# Patient Record
Sex: MALE | Race: WHITE | NOT HISPANIC OR LATINO | Employment: FULL TIME | URBAN - METROPOLITAN AREA
[De-identification: names, ages, dates, MRNs, and addresses within clinical notes are randomized per-mention and may not be internally consistent; named-entity substitution may affect disease eponyms.]

---

## 2023-03-16 DIAGNOSIS — K21.9 GASTROESOPHAGEAL REFLUX DISEASE WITHOUT ESOPHAGITIS: Primary | ICD-10-CM

## 2023-03-16 RX ORDER — CHOLECALCIFEROL (VITAMIN D3) 25 MCG
TABLET ORAL
COMMUNITY
Start: 2013-05-01

## 2023-03-16 RX ORDER — OMEPRAZOLE 20 MG/1
20 CAPSULE, DELAYED RELEASE ORAL 2 TIMES DAILY
Qty: 90 CAPSULE | Refills: 2 | Status: SHIPPED | OUTPATIENT
Start: 2023-03-16 | End: 2024-01-31 | Stop reason: DRUGHIGH

## 2023-03-16 RX ORDER — OMEPRAZOLE 20 MG/1
1 CAPSULE, DELAYED RELEASE ORAL 2 TIMES DAILY
COMMUNITY
Start: 2013-05-01 | End: 2023-03-16 | Stop reason: SDUPTHER

## 2023-03-16 RX ORDER — ROSUVASTATIN CALCIUM 20 MG/1
1 TABLET, COATED ORAL NIGHTLY
COMMUNITY
Start: 2019-03-20

## 2023-03-16 RX ORDER — VIT C/E/ZN/COPPR/LUTEIN/ZEAXAN 250MG-90MG
25 CAPSULE ORAL
COMMUNITY

## 2023-03-16 RX ORDER — LORATADINE 10 MG/1
1 TABLET ORAL DAILY
COMMUNITY
Start: 2013-05-01

## 2023-03-16 RX ORDER — GLUCOSAMINE/CHONDROITIN/C/MANG 500-400 MG
CAPSULE ORAL
COMMUNITY

## 2023-03-16 RX ORDER — ASPIRIN 81 MG/1
81 TABLET ORAL
COMMUNITY
Start: 2017-04-28 | End: 2024-05-06

## 2023-03-16 RX ORDER — PRASUGREL 10 MG/1
TABLET, FILM COATED ORAL
COMMUNITY
Start: 2022-05-20 | End: 2023-05-14

## 2023-03-16 RX ORDER — ACETAMINOPHEN 325 MG/1
325 TABLET ORAL EVERY 6 HOURS PRN
COMMUNITY
Start: 2022-04-21

## 2023-03-16 RX ORDER — EZETIMIBE 10 MG/1
1 TABLET ORAL NIGHTLY
COMMUNITY
Start: 2020-11-03 | End: 2024-06-04

## 2024-01-29 PROBLEM — M25.562 LEFT KNEE PAIN: Status: ACTIVE | Noted: 2024-01-29

## 2024-01-29 PROBLEM — M76.31 ILIOTIBIAL BAND SYNDROME OF RIGHT SIDE: Status: ACTIVE | Noted: 2024-01-29

## 2024-01-29 PROBLEM — M22.42 CHONDROMALACIA OF LEFT PATELLOFEMORAL JOINT: Status: ACTIVE | Noted: 2024-01-29

## 2024-01-29 PROBLEM — K64.4 EXTERNAL HEMORRHOIDS: Status: ACTIVE | Noted: 2024-01-29

## 2024-01-29 PROBLEM — I25.10 CAD (CORONARY ATHEROSCLEROTIC DISEASE): Status: ACTIVE | Noted: 2024-01-29

## 2024-01-29 PROBLEM — K60.2 ANAL FISSURE: Status: ACTIVE | Noted: 2024-01-29

## 2024-01-29 PROBLEM — K21.9 ESOPHAGEAL REFLUX: Status: ACTIVE | Noted: 2024-01-29

## 2024-01-29 PROBLEM — R07.9 CHEST PAIN: Status: ACTIVE | Noted: 2024-01-29

## 2024-01-29 PROBLEM — M25.561 KNEE PAIN, RIGHT: Status: ACTIVE | Noted: 2024-01-29

## 2024-01-29 PROBLEM — J31.0 RHINITIS: Status: ACTIVE | Noted: 2024-01-29

## 2024-01-29 PROBLEM — M76.51 PATELLAR TENDINITIS OF RIGHT KNEE: Status: ACTIVE | Noted: 2024-01-29

## 2024-01-30 PROBLEM — B35.1 ONYCHOMYCOSIS OF TOENAIL: Status: ACTIVE | Noted: 2024-01-30

## 2024-01-30 NOTE — PROGRESS NOTES
Subjective   Patient ID: Jadon Ramirez is a 48 y.o. male who presents for CPE    HPI   Over the past year, the patient reports continued chronic intermittent episodes of tightness aching in the left upper chest region radiating to the left upper back region.  He reports that the episodes still occur at rest and improve with activity.  He reports that the episodes are not affected by oral intake.  He reports no associated diaphoresis, shortness of breath, nausea, vomiting, presyncope.  Over the past few months, he reports an increase in the frequency of episodes which correlates to his weight gain.    Over the past year, the patient reports experiencing only 1 brief episode of spontaneous epistaxis from the left nostril.  This episode occurred while he was running outside and was 5 minutes in duration.  He reports no other associated symptoms.    Over the past year, the patient reports continued intermittent episodes of soreness in the anal region.  He does report that the episodes can occur after anal sex but on a few occasions have occurred while straining during defecation with passage of hard stool.  He reports no other associated symptoms.    Over the past year, the patient reports 2 brief episodes of pinching around either ankle.  He reports no precipitating, exacerbating, relieving factors.  He reports no radiation of discomfort and no other associated symptoms.  No other new complaints.  Review of Systems    Objective   There were no vitals taken for this visit.    Physical Exam  Head-palpation revealed no tenderness over the maxillary or frontal sinuses  Eyes-extraocular movements intact. Pupils equal react to light. Fundi revealed good retinal color, no hemorrhages or exudates  Ears-palpation of each pinna and each tragus revealed no tenderness. External auditory canals not erythematous or swollen, TMs clear  Nose-turbinates not erythematous or swollen. Nasal septal deviation noted to the  left  Mouth-posterior pharynx not erythematous or swollen. Tonsillar pillars appeared normal, no exudates  Neck-no lymphadenopathy. Thyroid gland not enlarged, no bruits  Breast-pierced nipples noted.  Lungs - clear to auscultation bilaterally.  Cardiac -rate normal, rhythm regular, no murmurs, no JVD.  Abdomen - soft nondistended, normal active bowel sounds. Palpation revealed no tenderness or masses.  Pulses-upper extremities 2+ bilaterally, lower extremities 0 bilaterally  Extremities - no peripheral edema.  Musculoskeletal  Chest-no erythema or swelling, Full range of motion in all directions of motion with no pain. Palpation revealed no tenderness or increase in warmth  Left knee-no erythema or swelling.  Full range of motion in all directions of motion no pain.  Palpation revealed no tenderness, increase in warmth, or crepitus.  Negative Lachemann's test, negative Kiya test, negative patellar apprehension test; no pain or laxity of the collateral ligaments noted.  Right knee-no erythema or swelling.  Full range of motion in all directions of motion with no pain.  Palpation did reveal mild crepitus but no tenderness or increase in warmth.  Negative Lachemann's test, negative Kiya's test, negative patellar apprehension test, no pain or laxity of the collateral ligaments noted.  Ankles-no erythema or swelling, Full range of motion in all directions of motion with no pain. Palpation revealed no tenderness or increase in warmth  Lumbar spine-no erythema or swelling. Full range of motion with tightness noted left buttock region on rotation to the left and bending to the right..  Full range of motion with discomfort noted in the right buttock region on bending to the left and rotation to the right.  Full range of motion with tightness and soreness noted on extension. Full range of motion with no soreness or tightness on flexion. Palpation revealed no tenderness or increase in warmth.  Neurologic  Cranial  nerves-2 through 12 grossly intact, no visual field abnormalities  Motor-no pronator drift noted, strength-5/5 in all muscle groups tested, , no tremor noted. No bradykinesia noted. No rigidity noted. Negative pull test  Sensory-light touch sensation fully intact  Pinprick sensation fully intact  Vibratory sensation fully intact  Cerebellar-no truncal ataxia, good coordination finger-nose testing,, good coordination heel-to-shin testing, normal rapid alternating movements  Romberg negative, no abnormality in tandem gait  Reflexes-1+/4 bilaterally in the upper extremities; 2+/4 bilaterally in the lower extremities  Assessment/Plan        Assessment  Chronic intermittent episodes of tightness aching in the left upper chest region radiating to the left upper back region--may be secondary to neuromuscular chest discomfort, gastroesophageal reflux  Coronary artery disease status post PTCA stenting 95% mid proximal LAD lesion April 2017, PTCA and stenting of a 80% circumflex lesion April 21, 2022  Hyposmia-may represent a side effect from administration of Effient.  Chronic occasional spontaneous episodes of epistaxis left nostril greater than right nostril-May represent a side effect from administration of Effient in the setting of dry nasal mucosa.  Allergic rhinitis  Status post tonsillectomy April 23, 2014  Gastroesophageal reflux  Chronic intermittent episodes of soreness in the anal region-may be secondary to anal fissure,   Anal fissure  Right posterior tender external hemorrhoid November 2021  Hemorrhoids  Herpes genitalis  Chronic intermittent interruption of urine stream-may be secondary to irritable bladder, BPH  Chronic intermittent episodes of aching pain in both knees-may be secondary to patellar tendinitis of the right knee, iliotibial band syndrome right knee, osteoarthritis  Vitamin D deficiency  Hyperlipidemia  Acne vulgaris  Onychomycosis fourth and fifth toenails right foot  Chronic occasional episodes  of soreness in the lower back region-may be secondary to lumbosacral strain, osteoarthritis and degenerative disc disease of the lumbosacral spine  Plan  Obtain CBC differential, CMP, fasting lipid profile, TSH, vitamin D level, vitamin B12 level, PSA, cardiac CRP as soon as possible..  I have recommended that the patient receive 2 doses of Shingrix beginning at age 50.  I recommended that he continue to receive 1 dose of flu vaccine every year and continue his booster doses of COVID-19 vaccine  I did asked the patient to apply Voltaren gel to painful regions 4 times per day. I also told the patient that he could take Tylenol 1000 mg p.o. every 6 hours as needed pain.  I recommended that he use saline nasal spray 2 sprays to each nostril 2-3 times per day as needed during the winter months to keep the nasal mucosa moist  Continue all other current medications for now.  Patient should return for complete physical examination in 1 year

## 2024-01-31 ENCOUNTER — OFFICE VISIT (OUTPATIENT)
Dept: CARDIOLOGY | Facility: CLINIC | Age: 49
End: 2024-01-31
Payer: COMMERCIAL

## 2024-01-31 ENCOUNTER — OFFICE VISIT (OUTPATIENT)
Dept: PRIMARY CARE | Facility: CLINIC | Age: 49
End: 2024-01-31
Payer: COMMERCIAL

## 2024-01-31 VITALS
HEIGHT: 69 IN | DIASTOLIC BLOOD PRESSURE: 82 MMHG | HEART RATE: 64 BPM | BODY MASS INDEX: 28.17 KG/M2 | OXYGEN SATURATION: 95 % | WEIGHT: 190.2 LBS | SYSTOLIC BLOOD PRESSURE: 145 MMHG

## 2024-01-31 VITALS
SYSTOLIC BLOOD PRESSURE: 100 MMHG | HEART RATE: 60 BPM | BODY MASS INDEX: 27.29 KG/M2 | DIASTOLIC BLOOD PRESSURE: 70 MMHG | WEIGHT: 184.8 LBS

## 2024-01-31 DIAGNOSIS — I25.10 ATHEROSCLEROSIS OF NATIVE CORONARY ARTERY OF NATIVE HEART WITHOUT ANGINA PECTORIS: Primary | ICD-10-CM

## 2024-01-31 DIAGNOSIS — M25.561 CHRONIC PAIN OF RIGHT KNEE: ICD-10-CM

## 2024-01-31 DIAGNOSIS — M22.42 CHONDROMALACIA OF LEFT PATELLOFEMORAL JOINT: ICD-10-CM

## 2024-01-31 DIAGNOSIS — G89.29 CHRONIC PAIN OF LEFT KNEE: ICD-10-CM

## 2024-01-31 DIAGNOSIS — L30.9 HAND ECZEMA: ICD-10-CM

## 2024-01-31 DIAGNOSIS — M25.562 CHRONIC PAIN OF LEFT KNEE: ICD-10-CM

## 2024-01-31 DIAGNOSIS — I20.89 OTHER FORMS OF ANGINA PECTORIS (CMS-HCC): ICD-10-CM

## 2024-01-31 DIAGNOSIS — Z00.00 ROUTINE GENERAL MEDICAL EXAMINATION AT A HEALTH CARE FACILITY: ICD-10-CM

## 2024-01-31 DIAGNOSIS — M76.51 PATELLAR TENDINITIS OF RIGHT KNEE: ICD-10-CM

## 2024-01-31 DIAGNOSIS — K21.9 GASTROESOPHAGEAL REFLUX DISEASE WITHOUT ESOPHAGITIS: ICD-10-CM

## 2024-01-31 DIAGNOSIS — G89.29 CHRONIC PAIN OF RIGHT KNEE: ICD-10-CM

## 2024-01-31 DIAGNOSIS — R07.89 OTHER CHEST PAIN: ICD-10-CM

## 2024-01-31 LAB
POC APPEARANCE, URINE: CLEAR
POC BILIRUBIN, URINE: NEGATIVE
POC BLOOD, URINE: NEGATIVE
POC COLOR, URINE: YELLOW
POC GLUCOSE, URINE: NEGATIVE MG/DL
POC KETONES, URINE: NEGATIVE MG/DL
POC LEUKOCYTES, URINE: NEGATIVE
POC NITRITE,URINE: NEGATIVE
POC PH, URINE: 6 PH
POC PROTEIN, URINE: NEGATIVE MG/DL
POC SPECIFIC GRAVITY, URINE: 1.02
POC UROBILINOGEN, URINE: 0.2 EU/DL

## 2024-01-31 PROCEDURE — 99214 OFFICE O/P EST MOD 30 MIN: CPT | Performed by: INTERNAL MEDICINE

## 2024-01-31 PROCEDURE — 81002 URINALYSIS NONAUTO W/O SCOPE: CPT | Performed by: INTERNAL MEDICINE

## 2024-01-31 PROCEDURE — 1036F TOBACCO NON-USER: CPT | Performed by: INTERNAL MEDICINE

## 2024-01-31 PROCEDURE — 93005 ELECTROCARDIOGRAM TRACING: CPT | Performed by: INTERNAL MEDICINE

## 2024-01-31 PROCEDURE — 99396 PREV VISIT EST AGE 40-64: CPT | Performed by: INTERNAL MEDICINE

## 2024-01-31 PROCEDURE — 99213 OFFICE O/P EST LOW 20 MIN: CPT | Performed by: INTERNAL MEDICINE

## 2024-01-31 PROCEDURE — 93010 ELECTROCARDIOGRAM REPORT: CPT | Performed by: INTERNAL MEDICINE

## 2024-01-31 RX ORDER — OMEPRAZOLE 20 MG/1
20 CAPSULE, DELAYED RELEASE ORAL DAILY
Qty: 90 CAPSULE | Refills: 2 | Status: SHIPPED | OUTPATIENT
Start: 2024-01-31 | End: 2024-03-18

## 2024-01-31 RX ORDER — FLUOCINONIDE 0.5 MG/G
OINTMENT TOPICAL 2 TIMES DAILY PRN
Qty: 30 G | Refills: 2 | Status: SHIPPED | OUTPATIENT
Start: 2024-01-31 | End: 2025-01-30

## 2024-01-31 NOTE — PROGRESS NOTES
Primary Care Physician: Alan Euceda MD  Date of Visit: 01/31/2024  9:40 AM EST  Location of visit: [facility] [place] [address]     Chief Complaint:   Chief Complaint   Patient presents with    Follow-up     1 year follow up.        HPI / Summary:   Jadon Ramirez is a 48 y.o. male presents for followup.     April 2017 LAD stent resolute integrity 2.5X 30 mm.  April 2022 cardiac catheterization widely patent LAD stent, high-grade circumflex stenosis with stenting resolute Finley 3.5X 15 mm.    Tightness around left chest over two months.  Stable frequency, no triggers.  Driving aggressively. Stress tension.    2017 CP with a run. LAD stent.  2022 felt sluggish when ran.  Ran 4 miles and felt well.  Does smoke a cigar rarely, and occasional ETOH.  Gained weight.    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    Specialty Problems          Cardiology Problems    CAD (coronary atherosclerotic disease)    Chest pain        Past Medical History:   Diagnosis Date    Personal history of other diseases of the digestive system 01/04/2023    History of esophageal reflux    Snoring     Snoring          Past Surgical History:   Procedure Laterality Date    OTHER SURGICAL HISTORY  12/13/2013    Dental Surgery          Social History:   reports that he has never smoked. He has never been exposed to tobacco smoke. He has never used smokeless tobacco. He reports current alcohol use of about 4.0 standard drinks of alcohol per week. He reports that he does not use drugs.      Allergies:  Allergies   Allergen Reactions    Atorvastatin Other and Headache       Outpatient Medications:  Current Outpatient Medications   Medication Instructions    acetaminophen (TYLENOL) 325 mg, oral, Every 6 hours PRN    aspirin 81 mg, oral    cholecalciferol (Vitamin D-3) 25 MCG (1000 UT) tablet oral    cholecalciferol (VITAMIN D-3) 25 mcg, oral    ezetimibe (Zetia) 10 mg tablet 1 tablet, oral, Nightly    glucosam/chond-msm1/C/asia/bor (GLUCOSAMINE-CHOND-MSM  "COMPLEX ORAL) 1 tablet, oral, Daily    glucosamine-chondroit-vit C-Mn 500-400 mg capsule oral    loratadine (Claritin) 10 mg tablet 1 tablet, oral, Daily    omeprazole (PRILOSEC) 20 mg, oral, 2 times daily    rosuvastatin (Crestor) 20 mg tablet 1 tablet, oral, Nightly       ROS     Physical Exam:  Vitals:    01/31/24 0950   BP: 145/82   Pulse: 64   SpO2: 95%   Weight: 86.3 kg (190 lb 3.2 oz)   Height: 1.753 m (5' 9\")     Wt Readings from Last 5 Encounters:   01/31/24 86.3 kg (190 lb 3.2 oz)   01/04/23 81.2 kg (179 lb 1 oz)   01/04/23 80.8 kg (178 lb 2 oz)   05/06/22 77.4 kg (170 lb 9 oz)   12/20/21 83 kg (183 lb 1 oz)     Body mass index is 28.09 kg/m².   Developed male in no acute distress.  Flat JVP.  Normal carotid upstrokes.  Regular rhythm no gallop or murmur.  Clear lungs.  Soft abdomen.  No dependent edema     Last Labs:  CMP:  Recent Labs     01/21/23  0813 04/21/22  0635 06/26/21  0949 12/30/20  0933 03/23/19  0957    139 138  --  139   K 3.9 3.9 4.1  --  4.1    105 104  --  103   CO2 29 25 26  --  28   ANIONGAP 11 13 12  --  12   BUN 14 11 14  --  17   CREATININE 1.21 1.12 1.15  --  1.16   GLUCOSE 90 89 96 84 86     Recent Labs     01/21/23  0813 06/26/21  0949 03/23/19  0957   ALBUMIN 4.5 4.6 4.3   ALKPHOS 59 68 66   ALT 33 51 28   AST 26 33 22   BILITOT 1.0 0.8 0.7     CBC:  Recent Labs     01/21/23  0813 04/21/22  0635 06/26/21  0949 03/23/19  0957 09/29/18  0836   WBC 4.8 5.3 4.7 5.3  --    HGB 14.8 14.7 14.7 14.2 13.8   HCT 43.6 42.3 43.5 42.9 42.0    200 199 196  --    MCV 92 92 94 93  --      COAG: No results for input(s): \"INR\", \"DDIMERVTE\" in the last 27992 hours.  HEME/ENDO:  Recent Labs     01/21/23  0813 06/26/21  0949 03/23/19  0957   TSH 0.93 0.83 0.58      CARDIAC: No results for input(s): \"LDH\", \"CKMB\", \"TROPHS\", \"BNP\" in the last 44723 hours.    No lab exists for component: \"CK\", \"CKMBP\"  Recent Labs     01/21/23  0813 06/26/21  0949 12/30/20  0933   CHOL 122 139 143 "   LDLF 54 64 57   HDL 49.2 46.0 45.0   TRIG 95 146 203*       Last Cardiology Tests:  ECG:    Sinus sinus rhythm with nonspecific ST-T abnormality  Echo:  Echo Results:  No results found for this or any previous visit from the past 3650 days.       Cath:      Stress Test:  Stress Results:  No results found for this or any previous visit from the past 365 days.         Cardiac Imaging:  Electrocardiogram 12 Lead  Sinus bradycardia  Possible Left atrial enlargement  Nonspecific T wave abnormality  Abnormal ECG  No previous ECGs available  Confirmed by MEDINA SCRUGGS MD ([id number]) on 1/4/2017 2:41:32 AM        Assessment/Plan   Nonexertional chest pain in this otherwise very fit and active gentleman.  Occasional tobacco use, occasional alcohol use.  ECG with minor nonspecific ST-T abnormality.  Recommend stress echo given the fact that he has relatively aggressive ASCVD requiring an LAD stent in 2017 and a circumflex stent in 2022.  [facility]  He may need clearance before his next appointment stress test will also be helpful in this regard        Orders:  No orders of the defined types were placed in this encounter.     Followup Appts:  Future Appointments   Date Time Provider Department Center   1/31/2024 11:00 AM Alan Euceda MD [id number] [facility]           ____________________________________________________________  Raymundo Yeung MD    Senior Attending Physician  [facility]  [facility]

## 2024-02-02 ENCOUNTER — LAB (OUTPATIENT)
Dept: LAB | Facility: LAB | Age: 49
End: 2024-02-02
Payer: COMMERCIAL

## 2024-02-02 DIAGNOSIS — G89.29 CHRONIC PAIN OF LEFT KNEE: ICD-10-CM

## 2024-02-02 DIAGNOSIS — L30.9 HAND ECZEMA: ICD-10-CM

## 2024-02-02 DIAGNOSIS — M25.562 CHRONIC PAIN OF LEFT KNEE: ICD-10-CM

## 2024-02-02 DIAGNOSIS — K21.9 GASTROESOPHAGEAL REFLUX DISEASE WITHOUT ESOPHAGITIS: ICD-10-CM

## 2024-02-02 DIAGNOSIS — I25.10 ATHEROSCLEROSIS OF NATIVE CORONARY ARTERY OF NATIVE HEART WITHOUT ANGINA PECTORIS: ICD-10-CM

## 2024-02-02 DIAGNOSIS — M22.42 CHONDROMALACIA OF LEFT PATELLOFEMORAL JOINT: ICD-10-CM

## 2024-02-02 DIAGNOSIS — Z00.00 ROUTINE GENERAL MEDICAL EXAMINATION AT A HEALTH CARE FACILITY: ICD-10-CM

## 2024-02-02 DIAGNOSIS — R07.89 OTHER CHEST PAIN: ICD-10-CM

## 2024-02-02 DIAGNOSIS — G89.29 CHRONIC PAIN OF RIGHT KNEE: ICD-10-CM

## 2024-02-02 DIAGNOSIS — M76.51 PATELLAR TENDINITIS OF RIGHT KNEE: ICD-10-CM

## 2024-02-02 DIAGNOSIS — M25.561 CHRONIC PAIN OF RIGHT KNEE: ICD-10-CM

## 2024-02-02 LAB
25(OH)D3 SERPL-MCNC: 45 NG/ML (ref 30–100)
ALBUMIN SERPL BCP-MCNC: 4.6 G/DL (ref 3.4–5)
ALP SERPL-CCNC: 54 U/L (ref 33–120)
ALT SERPL W P-5'-P-CCNC: 43 U/L (ref 10–52)
ANION GAP SERPL CALC-SCNC: 14 MMOL/L (ref 10–20)
AST SERPL W P-5'-P-CCNC: 27 U/L (ref 9–39)
BASOPHILS # BLD AUTO: 0.04 X10*3/UL (ref 0–0.1)
BASOPHILS NFR BLD AUTO: 0.8 %
BILIRUB SERPL-MCNC: 0.8 MG/DL (ref 0–1.2)
BUN SERPL-MCNC: 14 MG/DL (ref 6–23)
CALCIUM SERPL-MCNC: 9.5 MG/DL (ref 8.6–10.3)
CHLORIDE SERPL-SCNC: 103 MMOL/L (ref 98–107)
CHOLEST SERPL-MCNC: 140 MG/DL (ref 0–199)
CHOLESTEROL/HDL RATIO: 2.8
CO2 SERPL-SCNC: 28 MMOL/L (ref 21–32)
CREAT SERPL-MCNC: 1.13 MG/DL (ref 0.5–1.3)
CRP SERPL HS-MCNC: 0.3 MG/L
EGFRCR SERPLBLD CKD-EPI 2021: 80 ML/MIN/1.73M*2
EOSINOPHIL # BLD AUTO: 0.11 X10*3/UL (ref 0–0.7)
EOSINOPHIL NFR BLD AUTO: 2.2 %
ERYTHROCYTE [DISTWIDTH] IN BLOOD BY AUTOMATED COUNT: 12.3 % (ref 11.5–14.5)
GLUCOSE SERPL-MCNC: 96 MG/DL (ref 74–99)
HCT VFR BLD AUTO: 42.7 % (ref 41–52)
HCV AB SER QL: NONREACTIVE
HDLC SERPL-MCNC: 49.7 MG/DL
HGB BLD-MCNC: 15 G/DL (ref 13.5–17.5)
HIV 1+2 AB+HIV1 P24 AG SERPL QL IA: NONREACTIVE
IMM GRANULOCYTES # BLD AUTO: 0.01 X10*3/UL (ref 0–0.7)
IMM GRANULOCYTES NFR BLD AUTO: 0.2 % (ref 0–0.9)
LDLC SERPL CALC-MCNC: 49 MG/DL
LYMPHOCYTES # BLD AUTO: 2.26 X10*3/UL (ref 1.2–4.8)
LYMPHOCYTES NFR BLD AUTO: 44.7 %
MCH RBC QN AUTO: 32.4 PG (ref 26–34)
MCHC RBC AUTO-ENTMCNC: 35.1 G/DL (ref 32–36)
MCV RBC AUTO: 92 FL (ref 80–100)
MONOCYTES # BLD AUTO: 0.52 X10*3/UL (ref 0.1–1)
MONOCYTES NFR BLD AUTO: 10.3 %
NEUTROPHILS # BLD AUTO: 2.12 X10*3/UL (ref 1.2–7.7)
NEUTROPHILS NFR BLD AUTO: 41.8 %
NON HDL CHOLESTEROL: 90 MG/DL (ref 0–149)
NRBC BLD-RTO: 0 /100 WBCS (ref 0–0)
PLATELET # BLD AUTO: 204 X10*3/UL (ref 150–450)
POTASSIUM SERPL-SCNC: 4.1 MMOL/L (ref 3.5–5.3)
PROT SERPL-MCNC: 6.9 G/DL (ref 6.4–8.2)
PSA SERPL-MCNC: 1.02 NG/ML
RBC # BLD AUTO: 4.63 X10*6/UL (ref 4.5–5.9)
SODIUM SERPL-SCNC: 141 MMOL/L (ref 136–145)
TREPONEMA PALLIDUM IGG+IGM AB [PRESENCE] IN SERUM OR PLASMA BY IMMUNOASSAY: NONREACTIVE
TRIGL SERPL-MCNC: 209 MG/DL (ref 0–149)
TSH SERPL-ACNC: 1.33 MIU/L (ref 0.44–3.98)
VIT B12 SERPL-MCNC: 236 PG/ML (ref 211–911)
VLDL: 42 MG/DL (ref 0–40)
WBC # BLD AUTO: 5.1 X10*3/UL (ref 4.4–11.3)

## 2024-02-02 PROCEDURE — 86780 TREPONEMA PALLIDUM: CPT

## 2024-02-02 PROCEDURE — 86803 HEPATITIS C AB TEST: CPT

## 2024-02-02 PROCEDURE — 82306 VITAMIN D 25 HYDROXY: CPT

## 2024-02-02 PROCEDURE — 84443 ASSAY THYROID STIM HORMONE: CPT

## 2024-02-02 PROCEDURE — 86141 C-REACTIVE PROTEIN HS: CPT

## 2024-02-02 PROCEDURE — 36415 COLL VENOUS BLD VENIPUNCTURE: CPT

## 2024-02-02 PROCEDURE — 80061 LIPID PANEL: CPT

## 2024-02-02 PROCEDURE — 85025 COMPLETE CBC W/AUTO DIFF WBC: CPT

## 2024-02-02 PROCEDURE — 80053 COMPREHEN METABOLIC PANEL: CPT

## 2024-02-02 PROCEDURE — 84153 ASSAY OF PSA TOTAL: CPT

## 2024-02-02 PROCEDURE — 87389 HIV-1 AG W/HIV-1&-2 AB AG IA: CPT

## 2024-02-02 PROCEDURE — 82607 VITAMIN B-12: CPT

## 2024-02-05 LAB
ATRIAL RATE: 61 BPM
P AXIS: 47 DEGREES
P OFFSET: 160 MS
P ONSET: 109 MS
PR INTERVAL: 200 MS
Q ONSET: 209 MS
QRS COUNT: 11 BEATS
QRS DURATION: 86 MS
QT INTERVAL: 396 MS
QTC CALCULATION(BAZETT): 398 MS
QTC FREDERICIA: 397 MS
R AXIS: 56 DEGREES
T AXIS: 94 DEGREES
T OFFSET: 407 MS
VENTRICULAR RATE: 61 BPM

## 2024-03-15 DIAGNOSIS — K21.9 GASTROESOPHAGEAL REFLUX DISEASE WITHOUT ESOPHAGITIS: ICD-10-CM

## 2024-03-18 RX ORDER — OMEPRAZOLE 20 MG/1
CAPSULE, DELAYED RELEASE ORAL
Qty: 180 CAPSULE | Refills: 3 | Status: SHIPPED | OUTPATIENT
Start: 2024-03-18

## 2024-05-06 DIAGNOSIS — I25.10 ATHEROSCLEROSIS OF NATIVE CORONARY ARTERY OF NATIVE HEART WITHOUT ANGINA PECTORIS: Primary | ICD-10-CM

## 2024-05-06 RX ORDER — ASPIRIN 81 MG/1
81 TABLET ORAL DAILY
Qty: 90 TABLET | Refills: 3 | Status: SHIPPED | OUTPATIENT
Start: 2024-05-06

## 2024-05-30 ENCOUNTER — CLINICAL SUPPORT (OUTPATIENT)
Dept: PRIMARY CARE | Facility: CLINIC | Age: 49
End: 2024-05-30
Payer: COMMERCIAL

## 2024-05-30 DIAGNOSIS — Z23 ENCOUNTER FOR IMMUNIZATION: ICD-10-CM

## 2024-05-30 PROCEDURE — 90750 HZV VACC RECOMBINANT IM: CPT | Performed by: INTERNAL MEDICINE

## 2024-05-30 PROCEDURE — 90471 IMMUNIZATION ADMIN: CPT | Performed by: INTERNAL MEDICINE

## 2024-05-30 NOTE — PROGRESS NOTES
Subjective   Patient ID: Lacho Ramirez is a 49 y.o. male who presents for Immunizations. 1st dose of Shingles vaccination given in left deltoid.    HPI     Review of Systems    Objective   There were no vitals taken for this visit.    Physical Exam    Assessment/Plan

## 2024-06-04 DIAGNOSIS — I20.89 OTHER FORMS OF ANGINA PECTORIS (CMS-HCC): Primary | ICD-10-CM

## 2024-06-04 RX ORDER — EZETIMIBE 10 MG/1
10 TABLET ORAL NIGHTLY
Qty: 90 TABLET | Refills: 3 | Status: SHIPPED | OUTPATIENT
Start: 2024-06-04

## 2024-08-28 ENCOUNTER — APPOINTMENT (OUTPATIENT)
Dept: PRIMARY CARE | Facility: CLINIC | Age: 49
End: 2024-08-28
Payer: COMMERCIAL

## 2024-08-28 DIAGNOSIS — Z23 ENCOUNTER FOR IMMUNIZATION: Primary | ICD-10-CM

## 2024-08-28 PROCEDURE — 99212 OFFICE O/P EST SF 10 MIN: CPT | Performed by: INTERNAL MEDICINE

## 2024-08-28 PROCEDURE — 90471 IMMUNIZATION ADMIN: CPT | Performed by: FAMILY MEDICINE

## 2024-08-28 PROCEDURE — 90750 HZV VACC RECOMBINANT IM: CPT | Performed by: FAMILY MEDICINE

## 2024-11-07 DIAGNOSIS — I25.10 ATHEROSCLEROSIS OF NATIVE CORONARY ARTERY OF NATIVE HEART WITHOUT ANGINA PECTORIS: Primary | ICD-10-CM

## 2024-11-07 RX ORDER — ROSUVASTATIN CALCIUM 20 MG/1
20 TABLET, COATED ORAL NIGHTLY
Qty: 90 TABLET | Refills: 3 | Status: SHIPPED | OUTPATIENT
Start: 2024-11-07

## 2025-01-29 ENCOUNTER — APPOINTMENT (OUTPATIENT)
Dept: CARDIOLOGY | Facility: CLINIC | Age: 50
End: 2025-01-29
Payer: COMMERCIAL

## 2025-02-01 NOTE — PROGRESS NOTES
Subjective   Patient ID: Lacho Ramirez is a 49 y.o. male who presents for CPE    HPI   Since early November 2024, the patient reports a history of a nonproductive cough.  He reports that the cough may be precipitated by exposure to cold.  He does report associated intermittent nasal congestion, rhinorrhea, postnasal drip for a few days in early January.  He reports no other associated symptoms.  He did present to the emergency department at the [facility] December 1.  He had a thorough evaluation including chest x-ray, CT scan of the sinuses.  It was thought that the patient's cough may be secondary to gastroesophageal reflux.  He discontinued omeprazole approximately 5 weeks ago and began use of Nexium over-the-counter 20 mg daily.  Since beginning use of Nexium over-the-counter, he has noted a decrease in the frequency of cough.    Since spring 2024, he reports no episodes of tightness in the left upper chest region.    He reports that since October 2024, he has experienced 4 spontaneous episodes of epistaxis from the left nostril.  He reports that each episode was a few minutes in duration.  He reports no other associated symptoms.    Since 2021, he does report continued chronic hyposmia.  He reports no associated symptoms.  He reports that the hyposmia developed when he experienced loss of taste and cough in 2021.  The patient never tested for COVID-19 at that time.    Over the past year, the patient reports continued chronic intermittent interruption of urine stream.  He reports that the episodes only occur with decreased fluid intake.  He does report associated weaker urine stream at those times.  No other associated symptoms.    Over the past year, the patient reports continued chronic intermittent episodes of soreness in the anal region.  He again reports that the episodes can occur after anal sex and also can occur after straining during defecation.  He reports no associated symptoms.  Over the past year, the  patient reports a decrease in the frequency but no change in the intensity of the episodes.    No other new complaints.  Review of Systems    Objective   There were no vitals taken for this visit.    Physical Exam  Head-palpation revealed no tenderness over the maxillary or frontal sinuses  Eyes-extraocular movements intact. Pupils equal react to light. Fundi not well-visualized.  Ears-palpation of each pinna and each tragus revealed no tenderness. External auditory canals not erythematous or swollen, TMs clear  Nose-turbinates not erythematous or swollen. Nasal septal deviation noted to the left  Mouth-posterior pharynx not erythematous or swollen. Tonsillar pillars appeared normal, no exudates  Neck-no lymphadenopathy. Thyroid gland not enlarged, no bruits  Breast-pierced nipples noted.  Lungs - clear to auscultation bilaterally.  Cardiac -rate normal, rhythm regular, no murmurs, no JVD.  Abdomen - soft nondistended, normal active bowel sounds. Palpation revealed no tenderness or masses.  Pulses-upper extremities 2+ bilaterally, lower extremities 0 bilaterally  Extremities - no peripheral edema.  Musculoskeletal    Left knee-no erythema or swelling.  Full range of motion in all directions of motion no pain.  Palpation revealed mild crepitus but no tenderness, increase in warmth,.  Negative Lachemann's test, negative Kiya test, negative patellar apprehension test; no pain or laxity of the collateral ligaments noted.  Right knee-no erythema or swelling.  Full range of motion in all directions of motion with no pain.  Palpation did reveal mild crepitus but no tenderness or increase in warmth.  Negative Lachemann's test, negative Kiya's test, negative patellar apprehension test, no pain or laxity of the collateral ligaments noted.  Lumbar spine-no erythema or swelling.  Full range of motion with soreness noted on extension. Full range of motion with tightness noted on bending bilaterally.  Full range of motion  with no pain or tightness noted in all other directions of motion.  Palpation revealed no tenderness or increase in warmth.  Neurologic  Cranial nerves-2 through 12 grossly intact, no visual field abnormalities  Motor-no pronator drift noted, strength-5/5 in all muscle groups tested, , no tremor noted. No bradykinesia noted. No rigidity noted. Negative pull test  Sensory-light touch sensation fully intact  Pinprick sensation fully intact  Vibratory sensation fully intact  Cerebellar-no truncal ataxia, good coordination finger-nose testing,, good coordination heel-to-shin testing, normal rapid alternating movements  Romberg negative, no abnormality in tandem gait  Reflexes-1+/4 bilaterally   Assessment/Plan   Problem List Items Addressed This Visit             ICD-10-CM    CAD (coronary atherosclerotic disease) I25.10    Relevant Orders    CBC and Auto Differential    Comprehensive Metabolic Panel    Lipid Panel    Prostate Specific Antigen    Thyroid Stimulating Hormone    Vitamin B12    Vitamin D 25-Hydroxy,Total (for eval of Vitamin D levels)    Anal fissure K60.2    Relevant Orders    CBC and Auto Differential    Comprehensive Metabolic Panel    Lipid Panel    Prostate Specific Antigen    Thyroid Stimulating Hormone    Vitamin B12    Vitamin D 25-Hydroxy,Total (for eval of Vitamin D levels)    Esophageal reflux K21.9    Relevant Medications    esomeprazole (NexIUM) 40 mg DR capsule    Other Relevant Orders    CBC and Auto Differential    Comprehensive Metabolic Panel    Lipid Panel    Prostate Specific Antigen    Thyroid Stimulating Hormone    Vitamin B12    Vitamin D 25-Hydroxy,Total (for eval of Vitamin D levels)    Knee pain, right M25.561    Relevant Orders    CBC and Auto Differential    Comprehensive Metabolic Panel    Lipid Panel    Prostate Specific Antigen    Thyroid Stimulating Hormone    Vitamin B12    Vitamin D 25-Hydroxy,Total (for eval of Vitamin D levels)    Left knee pain M25.562    Relevant  Orders    CBC and Auto Differential    Comprehensive Metabolic Panel    Lipid Panel    Prostate Specific Antigen    Thyroid Stimulating Hormone    Vitamin B12    Vitamin D 25-Hydroxy,Total (for eval of Vitamin D levels)     Other Visit Diagnoses         Codes    Routine general medical examination at a health care facility    -  Primary Z00.00    Relevant Orders    CBC and Auto Differential    Comprehensive Metabolic Panel    Lipid Panel    Prostate Specific Antigen    Thyroid Stimulating Hormone    Vitamin B12    Vitamin D 25-Hydroxy,Total (for eval of Vitamin D levels)    Chronic cough     R05.3    Relevant Medications    esomeprazole (NexIUM) 40 mg DR capsule    fluticasone propion-salmeteroL (Advair Diskus) 100-50 mcg/dose diskus inhaler              Assessment    Coronary artery disease status post PTCA stenting 95% mid proximal LAD lesion April 2017, PTCA and stenting of a 80% circumflex lesion April 21, 2022  Chronic cough-May be secondary to gastroesophageal reflux.  I would also wonder whether there is a component of bronchospasm  Chronic hyposmia-.-May be secondary to long COVID  Chronic occasional spontaneous episodes of epistaxis left nostril greater than right nostril-May represent a side effect from administration of Effient in the setting of dry nasal mucosa.  Allergic rhinitis  Status post tonsillectomy April 23, 2014  Gastroesophageal reflux  Chronic intermittent episodes of soreness in the anal region-may be secondary to anal fissure,   Anal fissure  Right posterior tender external hemorrhoid November 2021  Hemorrhoids  Herpes genitalis  Chronic intermittent interruption of urine stream,weak stream-may be secondary to irritable bladder, BPH  Chronic intermittent episodes of aching pain in both knees-may be secondary to patellar tendinitis of the right knee, iliotibial band syndrome right knee, osteoarthritis  Vitamin D deficiency  Hyperlipidemia  Acne vulgaris  Onychomycosis fourth and fifth  toenails right foot  Chronic occasional episodes of soreness in the lower back region-may be secondary to lumbosacral strain, osteoarthritis and degenerative disc disease of the lumbosacral spine  Plan  Obtain CBC differential, CMP, fasting lipid profile, TSH, vitamin D level, vitamin B12 level, PSA, cardiac CRP as soon as possible..   I recommended that he continue to receive 1 dose of flu vaccine every year and continue his booster doses of COVID-19 vaccine  I did asked the patient to apply Voltaren gel to painful regions 4 times per day. I also told the patient that he could take Tylenol 1000 mg p.o. every 6 hours as needed pain.  I recommended that he use saline nasal spray 2 sprays to each nostril 2-3 times per day as needed during the winter months to keep the nasal mucosa moist  I recommended that the patient increase his Nexium dosage to 40 mg p.o. daily.  I have also added Advair 100-50 1 puff twice daily.  Continue all other current medications for now..  I recommended that the patient call me in 1 month with his condition regarding the cough  Patient should return for complete physical examination in 1 year

## 2025-02-03 ENCOUNTER — APPOINTMENT (OUTPATIENT)
Dept: PRIMARY CARE | Facility: CLINIC | Age: 50
End: 2025-02-03
Payer: COMMERCIAL

## 2025-02-03 VITALS
HEART RATE: 62 BPM | TEMPERATURE: 98.3 F | SYSTOLIC BLOOD PRESSURE: 100 MMHG | DIASTOLIC BLOOD PRESSURE: 70 MMHG | HEIGHT: 69 IN | WEIGHT: 179.6 LBS | BODY MASS INDEX: 26.6 KG/M2

## 2025-02-03 DIAGNOSIS — K21.9 GASTROESOPHAGEAL REFLUX DISEASE WITHOUT ESOPHAGITIS: ICD-10-CM

## 2025-02-03 DIAGNOSIS — I25.10 ATHEROSCLEROSIS OF NATIVE CORONARY ARTERY OF NATIVE HEART WITHOUT ANGINA PECTORIS: ICD-10-CM

## 2025-02-03 DIAGNOSIS — R05.3 CHRONIC COUGH: ICD-10-CM

## 2025-02-03 DIAGNOSIS — M25.561 CHRONIC PAIN OF RIGHT KNEE: ICD-10-CM

## 2025-02-03 DIAGNOSIS — K60.2 ANAL FISSURE: ICD-10-CM

## 2025-02-03 DIAGNOSIS — M25.562 CHRONIC PAIN OF LEFT KNEE: ICD-10-CM

## 2025-02-03 DIAGNOSIS — Z00.00 ROUTINE GENERAL MEDICAL EXAMINATION AT A HEALTH CARE FACILITY: Primary | ICD-10-CM

## 2025-02-03 DIAGNOSIS — G89.29 CHRONIC PAIN OF LEFT KNEE: ICD-10-CM

## 2025-02-03 DIAGNOSIS — G89.29 CHRONIC PAIN OF RIGHT KNEE: ICD-10-CM

## 2025-02-03 LAB
POC APPEARANCE, URINE: CLEAR
POC BILIRUBIN, URINE: NEGATIVE
POC BLOOD, URINE: NEGATIVE
POC COLOR, URINE: NORMAL
POC GLUCOSE, URINE: NEGATIVE MG/DL
POC KETONES, URINE: NEGATIVE MG/DL
POC LEUKOCYTES, URINE: NEGATIVE
POC NITRITE,URINE: NEGATIVE
POC PH, URINE: 7 PH
POC PROTEIN, URINE: NEGATIVE MG/DL
POC SPECIFIC GRAVITY, URINE: 1.01
POC UROBILINOGEN, URINE: 0.2 EU/DL

## 2025-02-03 PROCEDURE — 99213 OFFICE O/P EST LOW 20 MIN: CPT | Performed by: INTERNAL MEDICINE

## 2025-02-03 PROCEDURE — 81002 URINALYSIS NONAUTO W/O SCOPE: CPT | Performed by: INTERNAL MEDICINE

## 2025-02-03 PROCEDURE — 99396 PREV VISIT EST AGE 40-64: CPT | Performed by: INTERNAL MEDICINE

## 2025-02-03 PROCEDURE — 3008F BODY MASS INDEX DOCD: CPT | Performed by: INTERNAL MEDICINE

## 2025-02-03 PROCEDURE — 1036F TOBACCO NON-USER: CPT | Performed by: INTERNAL MEDICINE

## 2025-02-03 RX ORDER — FLUTICASONE PROPIONATE AND SALMETEROL 100; 50 UG/1; UG/1
1 POWDER RESPIRATORY (INHALATION)
Qty: 180 EACH | Refills: 0 | Status: SHIPPED | OUTPATIENT
Start: 2025-02-03 | End: 2026-02-03

## 2025-02-03 RX ORDER — HYDROGEN PEROXIDE 3 %
20 SOLUTION, NON-ORAL MISCELLANEOUS
COMMUNITY
End: 2025-02-03 | Stop reason: ALTCHOICE

## 2025-02-03 RX ORDER — ESOMEPRAZOLE MAGNESIUM 40 MG/1
40 CAPSULE, DELAYED RELEASE ORAL
Qty: 90 CAPSULE | Refills: 0 | Status: SHIPPED | OUTPATIENT
Start: 2025-02-03 | End: 2025-05-04

## 2025-02-04 LAB
25(OH)D3+25(OH)D2 SERPL-MCNC: 51 NG/ML (ref 30–100)
ALBUMIN SERPL-MCNC: 4.7 G/DL (ref 3.6–5.1)
ALP SERPL-CCNC: 60 U/L (ref 35–144)
ALT SERPL-CCNC: 49 U/L (ref 9–46)
ANION GAP SERPL CALCULATED.4IONS-SCNC: 9 MMOL/L (CALC) (ref 7–17)
AST SERPL-CCNC: 27 U/L (ref 10–35)
BASOPHILS # BLD AUTO: 39 CELLS/UL (ref 0–200)
BASOPHILS NFR BLD AUTO: 0.7 %
BILIRUB SERPL-MCNC: 0.9 MG/DL (ref 0.2–1.2)
BUN SERPL-MCNC: 15 MG/DL (ref 7–25)
CALCIUM SERPL-MCNC: 9.4 MG/DL (ref 8.6–10.3)
CHLORIDE SERPL-SCNC: 103 MMOL/L (ref 98–110)
CHOLEST SERPL-MCNC: 143 MG/DL
CHOLEST/HDLC SERPL: 2.6 (CALC)
CO2 SERPL-SCNC: 27 MMOL/L (ref 20–32)
CREAT SERPL-MCNC: 1.04 MG/DL (ref 0.7–1.3)
EGFRCR SERPLBLD CKD-EPI 2021: 87 ML/MIN/1.73M2
EOSINOPHIL # BLD AUTO: 50 CELLS/UL (ref 15–500)
EOSINOPHIL NFR BLD AUTO: 0.9 %
ERYTHROCYTE [DISTWIDTH] IN BLOOD BY AUTOMATED COUNT: 12.5 % (ref 11–15)
GLUCOSE SERPL-MCNC: 77 MG/DL (ref 65–139)
HCT VFR BLD AUTO: 45.8 % (ref 38.5–50)
HDLC SERPL-MCNC: 56 MG/DL
HGB BLD-MCNC: 15.1 G/DL (ref 13.2–17.1)
LDLC SERPL CALC-MCNC: 63 MG/DL (CALC)
LYMPHOCYTES # BLD AUTO: 2094 CELLS/UL (ref 850–3900)
LYMPHOCYTES NFR BLD AUTO: 37.4 %
MCH RBC QN AUTO: 31.5 PG (ref 27–33)
MCHC RBC AUTO-ENTMCNC: 33 G/DL (ref 32–36)
MCV RBC AUTO: 95.4 FL (ref 80–100)
MONOCYTES # BLD AUTO: 515 CELLS/UL (ref 200–950)
MONOCYTES NFR BLD AUTO: 9.2 %
NEUTROPHILS # BLD AUTO: 2901 CELLS/UL (ref 1500–7800)
NEUTROPHILS NFR BLD AUTO: 51.8 %
NONHDLC SERPL-MCNC: 87 MG/DL (CALC)
PLATELET # BLD AUTO: 223 THOUSAND/UL (ref 140–400)
PMV BLD REES-ECKER: 9.6 FL (ref 7.5–12.5)
POTASSIUM SERPL-SCNC: 4.2 MMOL/L (ref 3.5–5.3)
PROT SERPL-MCNC: 7.2 G/DL (ref 6.1–8.1)
PSA SERPL-MCNC: 1.1 NG/ML
RBC # BLD AUTO: 4.8 MILLION/UL (ref 4.2–5.8)
SODIUM SERPL-SCNC: 139 MMOL/L (ref 135–146)
TRIGL SERPL-MCNC: 158 MG/DL
TSH SERPL-ACNC: 0.59 MIU/L (ref 0.4–4.5)
VIT B12 SERPL-MCNC: 397 PG/ML (ref 200–1100)
WBC # BLD AUTO: 5.6 THOUSAND/UL (ref 3.8–10.8)

## 2025-02-10 RX ORDER — FAMOTIDINE 40 MG/1
40 TABLET, FILM COATED ORAL 2 TIMES DAILY
Qty: 60 TABLET | Refills: 5 | OUTPATIENT
Start: 2025-02-10 | End: 2025-08-09

## 2025-02-21 RX ORDER — FAMOTIDINE 40 MG/1
40 TABLET, FILM COATED ORAL 2 TIMES DAILY
Qty: 60 TABLET | Refills: 3 | OUTPATIENT
Start: 2025-02-21 | End: 2025-06-21

## 2025-03-04 RX ORDER — BENZONATATE 100 MG/1
100 CAPSULE ORAL 3 TIMES DAILY PRN
COMMUNITY
Start: 2024-12-01

## 2025-03-04 RX ORDER — FAMOTIDINE 40 MG/1
40 TABLET, FILM COATED ORAL DAILY
COMMUNITY
Start: 2024-12-01

## 2025-03-05 ENCOUNTER — OFFICE VISIT (OUTPATIENT)
Dept: CARDIOLOGY | Facility: CLINIC | Age: 50
End: 2025-03-05
Payer: COMMERCIAL

## 2025-03-05 VITALS
WEIGHT: 185.19 LBS | DIASTOLIC BLOOD PRESSURE: 71 MMHG | SYSTOLIC BLOOD PRESSURE: 119 MMHG | HEIGHT: 69 IN | BODY MASS INDEX: 27.43 KG/M2 | HEART RATE: 57 BPM | OXYGEN SATURATION: 95 %

## 2025-03-05 DIAGNOSIS — I25.10 ATHEROSCLEROSIS OF NATIVE CORONARY ARTERY OF NATIVE HEART WITHOUT ANGINA PECTORIS: ICD-10-CM

## 2025-03-05 PROCEDURE — 93010 ELECTROCARDIOGRAM REPORT: CPT | Performed by: INTERNAL MEDICINE

## 2025-03-05 PROCEDURE — 3008F BODY MASS INDEX DOCD: CPT | Performed by: INTERNAL MEDICINE

## 2025-03-05 PROCEDURE — 93005 ELECTROCARDIOGRAM TRACING: CPT | Performed by: INTERNAL MEDICINE

## 2025-03-05 PROCEDURE — 99214 OFFICE O/P EST MOD 30 MIN: CPT | Mod: 25 | Performed by: INTERNAL MEDICINE

## 2025-03-05 PROCEDURE — 1036F TOBACCO NON-USER: CPT | Performed by: INTERNAL MEDICINE

## 2025-03-05 PROCEDURE — 99214 OFFICE O/P EST MOD 30 MIN: CPT | Performed by: INTERNAL MEDICINE

## 2025-03-05 ASSESSMENT — PATIENT HEALTH QUESTIONNAIRE - PHQ9
SUM OF ALL RESPONSES TO PHQ9 QUESTIONS 1 AND 2: 0
1. LITTLE INTEREST OR PLEASURE IN DOING THINGS: NOT AT ALL
2. FEELING DOWN, DEPRESSED OR HOPELESS: NOT AT ALL

## 2025-03-05 ASSESSMENT — ENCOUNTER SYMPTOMS
OCCASIONAL FEELINGS OF UNSTEADINESS: 0
LOSS OF SENSATION IN FEET: 0
DEPRESSION: 0

## 2025-03-05 ASSESSMENT — COLUMBIA-SUICIDE SEVERITY RATING SCALE - C-SSRS
1. IN THE PAST MONTH, HAVE YOU WISHED YOU WERE DEAD OR WISHED YOU COULD GO TO SLEEP AND NOT WAKE UP?: NO
6. HAVE YOU EVER DONE ANYTHING, STARTED TO DO ANYTHING, OR PREPARED TO DO ANYTHING TO END YOUR LIFE?: NO
2. HAVE YOU ACTUALLY HAD ANY THOUGHTS OF KILLING YOURSELF?: NO

## 2025-03-05 ASSESSMENT — PAIN SCALES - GENERAL: PAINLEVEL_OUTOF10: 0-NO PAIN

## 2025-03-05 NOTE — PROGRESS NOTES
Primary Care Physician: Alan Euceda MD  Date of Visit: 03/05/2025  9:40 AM EST  Location of visit: [facility] [place] [address]     Chief Complaint:   Chief Complaint   Patient presents with    Wound Check    Coronary Artery Disease       Follow-up for chronic coronary disease.    HPI / Summary:   Jadon Ramirez is a 49 y.o. male presents for followup.     April 2017 LAD stent resolute integrity 2.5X 30 mm placed to proximal 95% stenosis.    Stress test in February 2022.  April 2022 cardiac catheterization widely patent LAD stent focal eccentric circumflex lesion mid vessel 80% successfully stented 3.5X 15 mm resolute [place]  Persistent cough, w[facility]  CAD on both sides      Specialty Problems          Cardiology Problems    CAD (coronary atherosclerotic disease)    Chest pain        Past Medical History:   Diagnosis Date    Personal history of other diseases of the digestive system 01/04/2023    History of esophageal reflux    Snoring     Snoring          Past Surgical History:   Procedure Laterality Date    OTHER SURGICAL HISTORY  12/13/2013    Dental Surgery          Social History:   reports that he has never smoked. He has never been exposed to tobacco smoke. He has never used smokeless tobacco. He reports current alcohol use of about 2.0 standard drinks of alcohol per week. He reports that he does not use drugs.      Allergies:  Allergies   Allergen Reactions    Atorvastatin Other and Headache    Ragweed Unknown       Outpatient Medications:  Current Outpatient Medications   Medication Instructions    acetaminophen (TYLENOL) 325 mg, Every 6 hours PRN    aspirin 81 mg, oral, Daily, Take with food.    benzonatate (TESSALON) 100 mg, 3 times daily PRN    cholecalciferol (Vitamin D-3) 25 MCG (1000 UT) tablet Take by mouth.    cholecalciferol (VITAMIN D-3) 25 mcg    esomeprazole (NEXIUM) 40 mg, oral, Daily before breakfast, Do not open capsule.    ezetimibe (ZETIA) 10 mg, oral, Nightly    famotidine (PEPCID) 40 mg, Daily  "   fluticasone propion-salmeteroL (Advair Diskus) 100-50 mcg/dose diskus inhaler 1 puff, inhalation, 2 times daily RT, Rinse mouth with water after use to reduce aftertaste and incidence of candidiasis. Do not swallow.    glucosam/chond-msm1/C/asia/bor (GLUCOSAMINE-CHOND-MSM COMPLEX ORAL) 1 tablet, Daily    glucosamine-chondroit-vit C-Mn 500-400 mg capsule Take by mouth.    loratadine (Claritin) 10 mg tablet 1 tablet, Daily    rosuvastatin (CRESTOR) 20 mg, oral, Nightly       ROS     Physical Exam:  Vitals:    03/05/25 0937   BP: 119/71   BP Location: Right arm   Patient Position: Sitting   BP Cuff Size: Large adult   Pulse: 57   SpO2: 95%   Weight: 84 kg (185 lb 3 oz)   Height: 1.753 m (5' 9\")     Wt Readings from Last 5 Encounters:   03/05/25 84 kg (185 lb 3 oz)   02/03/25 81.5 kg (179 lb 9.6 oz)   01/31/24 83.8 kg (184 lb 12.8 oz)   01/31/24 86.3 kg (190 lb 3.2 oz)   01/04/23 81.2 kg (179 lb 1 oz)     Body mass index is 27.35 kg/m².   Physical Exam   Well-appearing male in no acute distress.  Flat JVP.  Normal carotid upstrokes no bruits.  Regular rhythm without gallop.  Clear lungs without crackles.  Soft abdomen without masses.  No dependent edema with intact pedal pulses  Last Labs:  CMP:  Recent Labs     02/03/25  1038 02/02/24  0655 01/21/23  0813 04/21/22  0635 06/26/21  0949    141 142 139 138   K 4.2 4.1 3.9 3.9 4.1    103 106 105 104   CO2 27 28 29 25 26   ANIONGAP 9 14 11 13 12   BUN 15 14 14 11 14   CREATININE 1.04 1.13 1.21 1.12 1.15   EGFR 87 80  --   --   --    GLUCOSE 77 96 90 89 96     Recent Labs     02/03/25  1038 02/02/24  0655 01/21/23  0813 06/26/21  0949 03/23/19  0957   ALBUMIN 4.7 4.6 4.5 4.6 4.3   ALKPHOS 60 54 59 68 66   ALT 49* 43 33 51 28   AST 27 27 26 33 22   BILITOT 0.9 0.8 1.0 0.8 0.7     CBC:  Recent Labs     02/03/25  1038 02/02/24  0655 01/21/23  0813 04/21/22  0635 06/26/21  0949   WBC 5.6 5.1 4.8 5.3 4.7   HGB 15.1 15.0 14.8 14.7 14.7   HCT 45.8 42.7 43.6 42.3 43.5 " "   204 194 200 199   MCV 95.4 92 92 92 94     COAG: No results for input(s): \"INR\", \"DDIMERVTE\" in the last 59328 hours.  HEME/ENDO:  Recent Labs     02/03/25  1038 02/02/24  0655 01/21/23  0813 06/26/21  0949   TSH 0.59 1.33 0.93 0.83      CARDIAC: No results for input(s): \"LDH\", \"CKMB\", \"TROPHS\", \"BNP\" in the last 89965 hours.    No lab exists for component: \"CK\", \"CKMBP\"  Recent Labs     02/03/25  1038 02/02/24  0655 01/21/23  0813 06/26/21  0949 12/30/20  0933   CHOL 143 140 122 139 143   LDLF  --   --  54 64 57   HDL 56 49.7 49.2 46.0 45.0   TRIG 158* 209* 95 146 203*       Last Cardiology Tests:  ECG:  Normal record  Echo:  Echo Results:  No results found for this or any previous visit from the past 3650 days.       Cath:      Stress Test:  Stress Results:  No results found for this or any previous visit from the past 365 days.         Cardiac Imaging:  ECG 12 lead (Clinic Performed)  Normal sinus rhythm  ST & T wave abnormality, consider lateral ischemia  Abnormal ECG  When compared with ECG of 21-APR-2022 07:05,  Non-specific change in ST segment in Anterior leads  Inverted T waves have replaced nonspecific T wave abnormality in Anterior leads  Confirmed by Raymundo Yeung ([id number]) on 2/5/2024 12:41:41 PM        Assessment/Plan         There is clearly some undefined risk factor that we are not addressing.  He has a fairly exemplary lifestyle in terms of ASCVD risk he is not diabetic and does not smoke.  He remains fit and active in spite of this he has fairly extensive CAD and he developed worsening disease from [id number] in spite of lipids being in good control.  I have scheduled him for lipid NMR spectroscopy and an LP(a).  Likely try to get [place] PCSK9 I to drive his LDL to the floor.  I will call him after seeing results of testing  Orders:  No orders of the defined types were placed in this encounter.     Followup Appts:  Future Appointments   Date Time Provider Department Center   2/9/2026  9:20 AM " Alan Euceda MD [id number]           ____________________________________________________________  Raymundo Yeung MD    Senior Attending Physician  [facility]  [facility]

## 2025-03-06 LAB
ATRIAL RATE: 53 BPM
P AXIS: 61 DEGREES
P OFFSET: 171 MS
P ONSET: 114 MS
PR INTERVAL: 212 MS
Q ONSET: 220 MS
QRS COUNT: 9 BEATS
QRS DURATION: 82 MS
QT INTERVAL: 380 MS
QTC CALCULATION(BAZETT): 356 MS
QTC FREDERICIA: 364 MS
R AXIS: 88 DEGREES
T AXIS: 58 DEGREES
T OFFSET: 410 MS
VENTRICULAR RATE: 53 BPM

## 2025-05-01 DIAGNOSIS — I25.10 ATHEROSCLEROSIS OF NATIVE CORONARY ARTERY OF NATIVE HEART WITHOUT ANGINA PECTORIS: ICD-10-CM

## 2025-05-01 RX ORDER — ASPIRIN 81 MG/1
81 TABLET ORAL DAILY
Qty: 90 TABLET | Refills: 3 | Status: SHIPPED | OUTPATIENT
Start: 2025-05-01

## 2025-05-15 LAB
HDL SERPL QN: 8.7 NM
HDL SERPL-SCNC: 41.3 UMOL/L
HLD.LARGE SERPL-SCNC: 4 UMOL/L
LDL SERPL QN: 20.7 NM
LDL SERPL-SCNC: 1031 NMOL/L
LDL SMALL SERPL-SCNC: 574 NMOL/L
LPA SERPL-SCNC: 190 NMOL/L
VLDL LARGE SERPL-SCNC: 5 NMOL/L
VLDL SERPL QN: 52.3 NM

## 2025-05-16 ENCOUNTER — TELEPHONE (OUTPATIENT)
Dept: CARDIOLOGY | Facility: CLINIC | Age: 50
End: 2025-05-16
Payer: COMMERCIAL

## 2025-05-19 NOTE — TELEPHONE ENCOUNTER
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 Ask if he would be willing to try PCSK9i injectable as add on   treatment . Not sure if insurance would cover but we could try. I also left a Vm for him explaining all this as well.  ----- Message -----  From: [employer] Results In  Sent: 5/14/2025   4:51 PM EDT  To: Raymundo Yeung MD    
Attempted to call pt, no answer LVM requesting call back to review lab results and plan.   
1-2 cups/cans per day

## 2025-05-20 ENCOUNTER — DOCUMENTATION (OUTPATIENT)
Dept: CARDIOLOGY | Facility: CLINIC | Age: 50
End: 2025-05-20
Payer: COMMERCIAL

## 2025-05-20 DIAGNOSIS — I20.89 OTHER FORMS OF ANGINA PECTORIS: ICD-10-CM

## 2025-05-20 DIAGNOSIS — E78.49 FAMILIAL HYPERLIPIDEMIA: ICD-10-CM

## 2025-05-20 DIAGNOSIS — I25.10 ATHEROSCLEROSIS OF NATIVE CORONARY ARTERY OF NATIVE HEART WITHOUT ANGINA PECTORIS: Primary | ICD-10-CM

## 2025-05-20 RX ORDER — EVOLOCUMAB 140 MG/ML
140 INJECTION, SOLUTION SUBCUTANEOUS
Qty: 2 ML | Refills: 11 | Status: SHIPPED | OUTPATIENT
Start: 2025-05-20 | End: 2026-05-20

## 2025-05-20 NOTE — PROGRESS NOTES
H/o prior PCI, hence aggressive ASCVD at a young age with elevated Lpa and suspected FH, would target as low achievable LDL as possible. Hence will order PCSK9i

## 2025-05-23 PROCEDURE — RXMED WILLOW AMBULATORY MEDICATION CHARGE

## 2025-05-24 ENCOUNTER — PHARMACY VISIT (OUTPATIENT)
Dept: PHARMACY | Facility: CLINIC | Age: 50
End: 2025-05-24
Payer: COMMERCIAL

## 2025-05-30 DIAGNOSIS — I20.89 OTHER FORMS OF ANGINA PECTORIS: ICD-10-CM

## 2025-05-30 RX ORDER — EZETIMIBE 10 MG/1
10 TABLET ORAL NIGHTLY
Qty: 90 TABLET | Refills: 3 | Status: SHIPPED | OUTPATIENT
Start: 2025-05-30

## 2025-06-27 PROCEDURE — RXMED WILLOW AMBULATORY MEDICATION CHARGE

## 2025-06-28 ENCOUNTER — PHARMACY VISIT (OUTPATIENT)
Dept: PHARMACY | Facility: CLINIC | Age: 50
End: 2025-06-28
Payer: COMMERCIAL

## 2025-07-16 DIAGNOSIS — K21.9 GASTROESOPHAGEAL REFLUX DISEASE WITHOUT ESOPHAGITIS: Primary | ICD-10-CM

## 2025-07-16 RX ORDER — OMEPRAZOLE 20 MG/1
20 CAPSULE, DELAYED RELEASE ORAL DAILY
Qty: 90 CAPSULE | Refills: 3 | Status: SHIPPED | OUTPATIENT
Start: 2025-07-16 | End: 2026-07-11

## 2025-07-19 PROCEDURE — RXMED WILLOW AMBULATORY MEDICATION CHARGE

## 2025-07-26 ENCOUNTER — PHARMACY VISIT (OUTPATIENT)
Dept: PHARMACY | Facility: CLINIC | Age: 50
End: 2025-07-26
Payer: COMMERCIAL

## 2025-08-18 ENCOUNTER — TELEPHONE (OUTPATIENT)
Dept: CARDIOLOGY | Facility: HOSPITAL | Age: 50
End: 2025-08-18
Payer: COMMERCIAL

## 2025-08-18 DIAGNOSIS — E78.49 FAMILIAL HYPERLIPIDEMIA: ICD-10-CM

## 2025-08-18 DIAGNOSIS — I20.89 OTHER FORMS OF ANGINA PECTORIS: ICD-10-CM

## 2025-08-18 DIAGNOSIS — I25.10 ATHEROSCLEROSIS OF NATIVE CORONARY ARTERY OF NATIVE HEART WITHOUT ANGINA PECTORIS: ICD-10-CM

## 2025-08-18 RX ORDER — EVOLOCUMAB 140 MG/ML
140 INJECTION, SOLUTION SUBCUTANEOUS
Qty: 2 ML | Refills: 11 | Status: SHIPPED | OUTPATIENT
Start: 2025-08-18 | End: 2025-08-18

## 2025-08-18 RX ORDER — EVOLOCUMAB 140 MG/ML
140 INJECTION, SOLUTION SUBCUTANEOUS
Qty: 2 ML | Refills: 11 | Status: SHIPPED | OUTPATIENT
Start: 2025-08-18 | End: 2025-08-19 | Stop reason: SDUPTHER

## 2025-08-19 ENCOUNTER — TELEPHONE (OUTPATIENT)
Dept: PHARMACY | Facility: HOSPITAL | Age: 50
End: 2025-08-19
Payer: COMMERCIAL

## 2025-08-19 DIAGNOSIS — I25.10 ATHEROSCLEROSIS OF NATIVE CORONARY ARTERY OF NATIVE HEART WITHOUT ANGINA PECTORIS: Primary | ICD-10-CM

## 2025-08-26 PROCEDURE — RXMED WILLOW AMBULATORY MEDICATION CHARGE

## 2025-08-27 ENCOUNTER — PHARMACY VISIT (OUTPATIENT)
Dept: PHARMACY | Facility: CLINIC | Age: 50
End: 2025-08-27
Payer: COMMERCIAL

## 2026-02-09 ENCOUNTER — APPOINTMENT (OUTPATIENT)
Dept: PRIMARY CARE | Facility: CLINIC | Age: 51
End: 2026-02-09
Payer: COMMERCIAL

## 2026-03-04 ENCOUNTER — APPOINTMENT (OUTPATIENT)
Dept: CARDIOLOGY | Facility: CLINIC | Age: 51
End: 2026-03-04
Payer: COMMERCIAL